# Patient Record
Sex: FEMALE | Race: WHITE | NOT HISPANIC OR LATINO
[De-identification: names, ages, dates, MRNs, and addresses within clinical notes are randomized per-mention and may not be internally consistent; named-entity substitution may affect disease eponyms.]

---

## 2021-05-15 ENCOUNTER — HEALTH MAINTENANCE LETTER (OUTPATIENT)
Age: 61
End: 2021-05-15

## 2021-09-04 ENCOUNTER — HEALTH MAINTENANCE LETTER (OUTPATIENT)
Age: 61
End: 2021-09-04

## 2022-06-11 ENCOUNTER — HEALTH MAINTENANCE LETTER (OUTPATIENT)
Age: 62
End: 2022-06-11

## 2022-10-22 ENCOUNTER — HEALTH MAINTENANCE LETTER (OUTPATIENT)
Age: 62
End: 2022-10-22

## 2023-06-01 ENCOUNTER — HEALTH MAINTENANCE LETTER (OUTPATIENT)
Age: 63
End: 2023-06-01

## 2023-06-18 ENCOUNTER — HEALTH MAINTENANCE LETTER (OUTPATIENT)
Age: 63
End: 2023-06-18

## 2023-11-06 ENCOUNTER — APPOINTMENT (OUTPATIENT)
Dept: URBAN - METROPOLITAN AREA CLINIC 259 | Age: 63
Setting detail: DERMATOLOGY
End: 2023-11-10

## 2023-11-06 PROBLEM — C44.311 BASAL CELL CARCINOMA OF SKIN OF NOSE: Status: ACTIVE | Noted: 2023-11-06

## 2023-11-06 PROCEDURE — OTHER MOHS SURGERY: OTHER

## 2023-11-06 PROCEDURE — OTHER MIPS QUALITY: OTHER

## 2023-11-06 PROCEDURE — 17312 MOHS ADDL STAGE: CPT

## 2023-11-06 PROCEDURE — 17311 MOHS 1 STAGE H/N/HF/G: CPT

## 2023-11-06 NOTE — PROCEDURE: MOHS SURGERY
Patient/Caregiver provided printed discharge information. Keystone Flap Text: The defect edges were debeveled with a #15 scalpel blade. Given the location of the defect, shape of the defect a keystone flap was deemed most appropriate. Using a sterile surgical marker, an appropriate keystone flap was drawn incorporating the defect, outlining the appropriate donor tissue and placing the expected incisions within the relaxed skin tension lines where possible. The area thus outlined was incised deep to adipose tissue with a #15 scalpel blade. The skin margins were undermined to an appropriate distance in all directions around the primary defect and laterally outward around the flap utilizing iris scissors. Following this, the designed flap was carried into the primary defect and sutured into place.

## 2023-11-06 NOTE — PROCEDURE: MIPS QUALITY
Quality 431: Preventive Care And Screening: Unhealthy Alcohol Use - Screening: Patient not identified as an unhealthy alcohol user when screened for unhealthy alcohol use using a systematic screening method
Quality 110: Preventive Care And Screening: Influenza Immunization: Influenza Immunization Administered during Influenza season
Quality 111:Pneumonia Vaccination Status For Older Adults: Patient did not receive any pneumococcal conjugate or polysaccharide vaccine on or after their 60th birthday and before the end of the measurement period
Detail Level: Detailed
Quality 226: Preventive Care And Screening: Tobacco Use: Screening And Cessation Intervention: Patient screened for tobacco use and is an ex/non-smoker
Quality 130: Documentation Of Current Medications In The Medical Record: Current Medications Documented
Quality 143: Oncology: Medical And Radiation- Pain Intensity Quantified: Pain severity quantified, no pain present

## 2023-11-06 NOTE — PROCEDURE: MOHS SURGERY
X Size Of Lesion In Cm (Optional): 0 Add Variable For Additional Medical Justification: No Lab: [id number] Consent was obtained from the patient. The risks and benefits to therapy were discussed in detail. Specifically, the risks of infection, scarring, bleeding, prolonged wound healing, incomplete removal, allergy to anesthesia, nerve injury and recurrence were addressed. Prior to the procedure, the treatment site was clearly identified and confirmed by the patient. All components of Universal Protocol/PAUSE Rule completed. Lab Facility: [id number] Anesthesia Type: 1% lidocaine with epinephrine Billing Type: Third-Party Bill Was A Bandage Applied: Yes Wound Care: Bacitracin Post-Care Instructions: I reviewed with the patient in detail post-care instructions. Patient is to keep the biopsy site dry overnight, and then apply bacitracin twice daily until healed. Patient may apply hydrogen peroxide soaks to remove any crusting. Medical Necessity Clause: This procedure was medically necessary because the lesion that was treated was: Biopsy Method: Dermablade Notification Instructions: Patient will be notified of biopsy results. However, patient instructed to call the office if not contacted within 2 weeks. Detail Level: Detailed Hemostasis: [place] Medical Necessity Information: It is in your best interest to select a reason for this procedure from the list below. All of these items fulfill various CMS LCD requirements except the new and changing color options. Size Of Lesion In Cm (Required): 1.1 Path Notes (To The Dermatopathologist): Please check margins. Split-Thickness Skin Graft Text: The defect edges were debeveled with a #15 scalpel blade. Given the location of the defect, shape of the defect and the proximity to free margins a split thickness skin graft was deemed most appropriate. Using a sterile surgical marker, the primary defect shape was transferred to the donor site. The split thickness graft was then harvested.  The skin graft was then placed in the primary defect and oriented appropriately.

## 2023-11-06 NOTE — PROCEDURE: MOHS SURGERY
Past Medical History:   Diagnosis Date    Anemia     Anticoagulant long-term use     Arthritis     Atrial fibrillation     CKD (chronic kidney disease), stage IV 5/8/2018    Congestive heart failure     COPD (chronic obstructive pulmonary disease)     Deep vein thrombosis     elevated bilirubin d/t Gilbert's syndrome     confirmed by [place] genetic testing, evaluated by hepatology    Encounter for blood transfusion     GERD (gastroesophageal reflux disease)     Hypertension     Hypertensive cardiovascular-renal disease, stage 1-4 or unspecified chronic kidney disease, with heart failure 3/4/2022    Pheochromocytoma, malignant     Restrictive lung disease 4/26/2022    Right kidney mass     Sleep apnea     Thalassemia trait, alpha     Thyroid disease     Type 2 diabetes mellitus with hyperglycemia, without long-term current use of insulin 8/13/2020       Past Surgical History:   Procedure Laterality Date    APPENDECTOMY      BONE MARROW BIOPSY      CARDIAC DEFIBRILLATOR PLACEMENT Left 12/2016    CARDIAC ELECTROPHYSIOLOGY MAPPING AND ABLATION      CARDIAC ELECTROPHYSIOLOGY MAPPING AND ABLATION      COLONOSCOPY N/A 5/6/2022    Procedure: COLONOSCOPY;  Surgeon: Arely Betancourt MD;  Location: [facility] ([address] [place] [address]);  Service: Endoscopy;  Laterality: N/A;  heart transplant candidate/ EF 25% - 2nd floor/ defib - Biotronik - ERW  Eliquis - per Dr. Cortez with [facility], Pt ok to hold Eliquis x 2 days prior-see media tab-outside correspondence dated 12/30/21  - [facility]  verbal instructions/portal instructions/email instructions - s    EYE SURGERY      due to running tears    FRACTURE SURGERY Left     hand 5th digit    HYSTERECTOMY      KNEE SURGERY Left 2016    hematoma    LIVER BIOPSY  10/24/2018    Minimal steatosis, predominantly macrovesicular, 1%, Minimal nonspecific portal inflammation, no fibrosis. No findings on biopsy to explain elevated bilirubin levels. Could be d/t Gilbert's =?- hemolysis    RIGHT HEART CATHETERIZATION  "Right 12/07/2021    Procedure: INSERTION, CATHETER, RIGHT HEART;  Surgeon: Irving Cardenas MD;  Location: [facility] CATH LAB;  Service: Cardiology;  Laterality: Right;    TRANSJUGULAR BIOPSY OF LIVER N/A 10/24/2018    Procedure: BIOPSY, LIVER, TRANSJUGULAR APPROACH;  Surgeon: Carmen Diagnostic Provider;  Location: [facility] OR [address] [place] [address];  Service: Radiology;  Laterality: N/A;       Review of patient's allergies indicates:   Allergen Reactions    Penicillins Hives    Iodinated contrast media Nausea And Vomiting    Oxycodone-acetaminophen Other (See Comments)     Nausea, Dizziness, Anxiety.  "I don't like how it makes me feel."   Given Hydromorphone 0.5mg IVP  Without problems.  Other reaction(s): Other (See Comments)    Diovan hct [valsartan-hydrochlorothiazide] Other (See Comments)     Causes coughing    Irinotecan      Pt has homozygosity for the TA7 promoter variant that places this individual at significantly increased risk for   severe neutropenia (grade 4) when treated with the standard dose of irinotecan (risk approximately 50%).   Other drugs that have been demonstrated to be impacted by homozygosity for the UGT1A1 TA7 promoter variant include pazopanib, nilotinib, atazanavir, and belinostat. Metabolism of other drugs not listed here may also be impacted by UGT1A1 enzyme activity.       Tramadol Nausea And Vomiting     Other reaction(s): Other (See Comments)     Current Facility-Administered Medications   Medication Frequency    albuterol-ipratropium 2.5 mg-0.5 mg/3 mL nebulizer solution 3 mL BID    allopurinoL tablet 100 mg Daily    ALPRAZolam tablet 1 mg BID PRN    amiodarone tablet 200 mg Daily    apixaban tablet 5 mg BID    atorvastatin tablet 40 mg QHS    dextromethorphan-guaiFENesin  mg per 12 hr tablet 2 tablet BID    dextrose 10% bolus 125 mL PRN    dextrose 10% bolus 250 mL PRN    ferrous sulfate tablet 1 each Daily    fluticasone furoate-vilanteroL 200-25 mcg/dose diskus inhaler 1 puff Daily    fluticasone " propionate 50 mcg/actuation nasal spray 100 mcg Daily    furosemide injection 80 mg Q12H    glucagon (human recombinant) injection 1 mg PRN    glucose chewable tablet 16 g PRN    glucose chewable tablet 24 g PRN    hydrALAZINE tablet 25 mg TID    hydrocodone-apap 7.5-325 MG/15 ML oral solution 10 mL Q8H PRN    insulin aspart U-100 pen 0-5 Units QID (AC + HS) PRN    isosorbide dinitrate tablet 20 mg TID    melatonin tablet 6 mg Nightly PRN    metoprolol succinate (TOPROL-XL) 24 hr tablet 100 mg Daily    montelukast tablet 10 mg Daily    naloxone 0.4 mg/mL injection 0.02 mg PRN    nitroGLYCERIN SL tablet 0.4 mg Q5 Min PRN    pantoprazole EC tablet 40 mg Daily    polyethylene glycol packet 17 g Daily    prochlorperazine injection Soln 2.5 mg Q6H PRN    sacubitriL-valsartan  mg per tablet 1 tablet BID    sodium chloride 0.9% flush 10 mL PRN    sodium chloride 0.9% flush 10 mL Q12H PRN    tiotropium bromide 2.5 mcg/actuation inhaler 2 puff Daily     Facility-Administered Medications Ordered in Other Encounters   Medication Frequency    0.9%  NaCl infusion Continuous    vancomycin in dextrose 5 % 1 gram/250 mL IVPB 1,000 mg On Call Procedure     Family History       Problem Relation (Age of Onset)    Cancer Mother    Cirrhosis Brother    Diabetes Brother    Heart attack Father    Heart disease Father, Sister, Brother, Sister, Brother    Hypertension Father, Brother          Tobacco Use    Smoking status: Never    Smokeless tobacco: Never   Substance and Sexual Activity    Alcohol use: Yes     Comment: up to 1 yr ago drank 2-3 drinks on occasion but sporadic    Drug use: No    Sexual activity: Yes     Partners: Male     Review of Systems   Constitutional:  Negative for activity change, chills, fatigue and fever.   HENT:  Negative for congestion, nosebleeds and trouble swallowing.    Respiratory:  Positive for cough and shortness of breath. Negative for apnea, choking and chest tightness.    Cardiovascular:  Positive  for chest pain and leg swelling.   Gastrointestinal:  Negative for abdominal distention, abdominal pain, constipation, diarrhea, nausea and vomiting.   Genitourinary:  Negative for decreased urine volume, difficulty urinating, dysuria and frequency.   Musculoskeletal:  Negative for arthralgias, back pain, joint swelling, neck pain and neck stiffness.   Skin:  Negative for rash and wound.   Neurological:  Positive for dizziness. Negative for seizures, syncope, weakness, light-headedness, numbness and headaches.   Psychiatric/Behavioral:  Negative for agitation, behavioral problems, confusion, hallucinations, self-injury and sleep disturbance. The patient is not nervous/anxious.    Objective:     Vital Signs (Most Recent):  Temp: 96.7 °F (35.9 °C) (11/18/22 1048)  Pulse: 70 (11/18/22 1517)  Resp: 16 (11/18/22 1356)  BP: (!) 107/56 (11/18/22 1048)  SpO2: (!) 92 % (11/18/22 1048)  O2 Device (Oxygen Therapy): nasal cannula (11/18/22 0802)   Vital Signs (24h Range):  Temp:  [96.7 °F (35.9 °C)-98 °F (36.7 °C)] 96.7 °F (35.9 °C)  Pulse:  [61-86] 70  Resp:  [16-18] 16  SpO2:  [92 %-97 %] 92 %  BP: (107-138)/(51-84) 107/56     Weight: 100.2 kg (220 lb 14.4 oz) (11/18/22 1121)  Body mass index is 35.65 kg/m².  Body surface area is 2.16 meters squared.    I/O last 3 completed shifts:  In: -   Out: 3000 [Urine:3000]    Physical Exam  Constitutional:       General: She is not in acute distress.     Appearance: Normal appearance. She is obese. She is ill-appearing. She is not toxic-appearing or diaphoretic.   HENT:      Head: Normocephalic and atraumatic.      Nose: Nose normal.      Mouth/Throat:      Mouth: Mucous membranes are moist.   Eyes:      General: No scleral icterus.     Extraocular Movements: Extraocular movements intact.      Pupils: Pupils are equal, round, and reactive to light.   Cardiovascular:      Rate and Rhythm: Normal rate and regular rhythm.      Pulses: Normal pulses.      Heart sounds: Normal heart sounds.  "No murmur heard.    No friction rub. No gallop.   Pulmonary:      Effort: Pulmonary effort is normal.      Breath sounds: Normal breath sounds. No wheezing or rhonchi.   Chest:      Chest wall: No tenderness.   Abdominal:      General: Abdomen is flat. Bowel sounds are normal. There is no distension.      Palpations: Abdomen is soft.      Tenderness: There is no abdominal tenderness. There is no right CVA tenderness, left CVA tenderness, guarding or rebound.   Musculoskeletal:         General: Swelling present. No tenderness, deformity or signs of injury. Normal range of motion.      Cervical back: Normal range of motion and neck supple. No rigidity or tenderness.      Right lower leg: Edema present.      Left lower leg: Edema present.   Lymphadenopathy:      Cervical: No cervical adenopathy.   Skin:     General: Skin is warm and dry.      Coloration: Skin is not jaundiced or pale.      Findings: No erythema or rash.   Neurological:      General: No focal deficit present.      Mental Status: She is alert and oriented to person, place, and time. Mental status is at baseline.      Cranial Nerves: No cranial nerve deficit.      Motor: No weakness.   Psychiatric:         Mood and Affect: Mood normal.         Behavior: Behavior normal.         Thought Content: Thought content normal.         Judgment: Judgment normal.       Significant Labs:  {Select Labs:92736::"All labs within the past 24 hours have been reviewed."}    Significant Imaging:  {Results; Diagnostics:20673932::"***"}  " Intermediate Repair And Graft Additional Text (Will Appearing After The Standard Complex Repair Text): The intermediate repair was not sufficient to completely close the primary defect. The remaining additional defect was repaired with the graft mentioned below.

## 2023-11-20 ENCOUNTER — APPOINTMENT (OUTPATIENT)
Dept: URBAN - METROPOLITAN AREA CLINIC 259 | Age: 63
Setting detail: DERMATOLOGY
End: 2023-11-20

## 2023-11-20 PROBLEM — C44.311 BASAL CELL CARCINOMA OF SKIN OF NOSE: Status: ACTIVE | Noted: 2023-11-20

## 2023-11-20 PROCEDURE — OTHER MIPS QUALITY: OTHER

## 2023-11-20 PROCEDURE — 15260 FTH/GFT FR N/E/E/L 20 SQCM/<: CPT

## 2023-11-20 PROCEDURE — OTHER REPAIR NOTE: OTHER

## 2023-11-20 NOTE — PROCEDURE: REPAIR NOTE
Graft Donor Site Bandage (Optional-Leave Blank If You Don't Want In Note): Vaselin and a pressure bandage were applied to the donor site.

## 2023-11-20 NOTE — PROCEDURE: REPAIR NOTE
Modify Regimen: Switch pm moisturizer to something that is not as thick Plan: Patient will send labs drawn by endocrinologist Detail Level: Zone Full Thickness Lip Wedge Repair (Flap) Text: Given the location of the defect and the proximity to free margins a full thickness wedge repair was deemed most appropriate. Using a sterile surgical marker, the appropriate repair was drawn incorporating the defect and placing the expected incisions perpendicular to the vermilion border.  The vermilion border was also meticulously outlined to ensure appropriate reapproximation during the repair.  The area thus outlined was incised through and through with a #15 scalpel blade.  The muscularis and dermis were reaproximated with deep sutures following hemostasis. Care was taken to realign the vermilion border before proceeding with the superficial closure.  Once the vermilion was realigned the superfical and mucosal closure was finished.

## 2023-11-20 NOTE — PROCEDURE: MIPS QUALITY
Quality 431: Preventive Care And Screening: Unhealthy Alcohol Use - Screening: Patient not identified as an unhealthy alcohol user when screened for unhealthy alcohol use using a systematic screening method
Quality 110: Preventive Care And Screening: Influenza Immunization: Influenza Immunization Administered during Influenza season
Quality 111:Pneumonia Vaccination Status For Older Adults: Patient did not receive any pneumococcal conjugate or polysaccharide vaccine on or after their 60th birthday and before the end of the measurement period
Detail Level: Detailed
Quality 226: Preventive Care And Screening: Tobacco Use: Screening And Cessation Intervention: Patient screened for tobacco use and is an ex/non-smoker
Quality 130: Documentation Of Current Medications In The Medical Record: Current Medications Documented

## 2023-11-29 ENCOUNTER — APPOINTMENT (OUTPATIENT)
Dept: URBAN - METROPOLITAN AREA CLINIC 259 | Age: 63
Setting detail: DERMATOLOGY
End: 2023-11-29

## 2023-11-29 DIAGNOSIS — L98419 CHRONIC ULCER OF OTHER SPECIFIED SITES: ICD-10-CM

## 2023-11-29 DIAGNOSIS — L98429 CHRONIC ULCER OF OTHER SPECIFIED SITES: ICD-10-CM

## 2023-11-29 DIAGNOSIS — Z48.02 ENCOUNTER FOR REMOVAL OF SUTURES: ICD-10-CM

## 2023-11-29 PROBLEM — L98.499 NON-PRESSURE CHRONIC ULCER OF SKIN OF OTHER SITES WITH UNSPECIFIED SEVERITY: Status: ACTIVE | Noted: 2023-11-29

## 2023-11-29 PROCEDURE — OTHER MIPS QUALITY: OTHER

## 2023-11-29 PROCEDURE — 99024 POSTOP FOLLOW-UP VISIT: CPT

## 2023-11-29 PROCEDURE — OTHER DIAGNOSIS COMMENT: OTHER

## 2023-11-29 PROCEDURE — OTHER ULCER EVALUATION: OTHER

## 2023-11-29 PROCEDURE — OTHER SUTURE REMOVAL (GLOBAL PERIOD): OTHER

## 2023-11-29 PROCEDURE — OTHER COUNSELING: OTHER

## 2023-11-29 ASSESSMENT — LOCATION ZONE DERM
LOCATION ZONE: NOSE
LOCATION ZONE: EAR

## 2023-11-29 ASSESSMENT — LOCATION SIMPLE DESCRIPTION DERM
LOCATION SIMPLE: RIGHT EAR
LOCATION SIMPLE: RIGHT NOSE

## 2023-11-29 ASSESSMENT — LOCATION DETAILED DESCRIPTION DERM
LOCATION DETAILED: RIGHT ANTIHELIX
LOCATION DETAILED: RIGHT NASAL SIDEWALL

## 2023-11-29 NOTE — PROCEDURE: DIAGNOSIS COMMENT
Render Risk Assessment In Note?: no
Detail Level: Simple
Comment: S/P FTSG for a Primary Infiltrative Basal Cell Carcinoma on (11/20/23)
Comment: S/P Full Thickness Skin Graft S/P Mohs of a Primary infiltrative basal cell carcinoma on November 20, 2023

## 2023-11-29 NOTE — PROCEDURE: ULCER EVALUATION
Instructions (Optional): Physical Examination:\\nEschar: soft, fibrinous \\nGranulation Tissue: present \\nRe-Epithelialization: progressing \\nDrainage: serosanguinous \\nSurrounding Edema: absent\\nPain to palpation: 2/ 10\\nOdor: none\\nSurrounding Dermatitis: absent\\n\\nAssessment:\\nHealing as expected for dates\\nNo signs / symptoms of infection\\n\\nPlan:\\nWound cleaned with H2O2\\nWhite petrolatum ointment and bandage applied.\\nContinue QD wound care as instructed.\\nRTC in one month.
Detail Level: Detailed
Size Of Lesion In Cm (Optional): 0

## 2023-12-12 ENCOUNTER — APPOINTMENT (OUTPATIENT)
Dept: URBAN - METROPOLITAN AREA CLINIC 259 | Age: 63
Setting detail: DERMATOLOGY
End: 2023-12-13

## 2023-12-12 DIAGNOSIS — Z48.02 ENCOUNTER FOR REMOVAL OF SUTURES: ICD-10-CM

## 2023-12-12 PROCEDURE — OTHER COUNSELING: OTHER

## 2023-12-12 PROCEDURE — OTHER SUTURE REMOVAL (GLOBAL PERIOD): OTHER

## 2023-12-12 ASSESSMENT — LOCATION DETAILED DESCRIPTION DERM: LOCATION DETAILED: RIGHT NASAL ALA

## 2023-12-12 ASSESSMENT — LOCATION SIMPLE DESCRIPTION DERM: LOCATION SIMPLE: RIGHT NOSE

## 2023-12-12 ASSESSMENT — LOCATION ZONE DERM: LOCATION ZONE: NOSE

## 2023-12-12 NOTE — PROCEDURE: COUNSELING
Patient Specific Counseling (Will Not Stick From Patient To Patient): **Wound evaluated by Mary Fierro PA-C. Reassured surgical site is healing well.
Detail Level: Detailed

## 2023-12-12 NOTE — PROCEDURE: SUTURE REMOVAL (GLOBAL PERIOD)
Detail Level: Detailed
Add 44870 Cpt? (Important Note: In 2017 The Use Of 74090 Is Being Tracked By Cms To Determine Future Global Period Reimbursement For Global Periods): no

## 2023-12-27 ENCOUNTER — APPOINTMENT (OUTPATIENT)
Dept: URBAN - METROPOLITAN AREA CLINIC 259 | Age: 63
Setting detail: DERMATOLOGY
End: 2023-12-28

## 2023-12-27 DIAGNOSIS — L98429 CHRONIC ULCER OF OTHER SPECIFIED SITES: ICD-10-CM

## 2023-12-27 DIAGNOSIS — L98419 CHRONIC ULCER OF OTHER SPECIFIED SITES: ICD-10-CM

## 2023-12-27 DIAGNOSIS — Z85.828 PERSONAL HISTORY OF OTHER MALIGNANT NEOPLASM OF SKIN: ICD-10-CM

## 2023-12-27 PROBLEM — L98.499 NON-PRESSURE CHRONIC ULCER OF SKIN OF OTHER SITES WITH UNSPECIFIED SEVERITY: Status: ACTIVE | Noted: 2023-12-27

## 2023-12-27 PROCEDURE — OTHER RETURN TO REFERRING PROVIDER: OTHER

## 2023-12-27 PROCEDURE — OTHER ULCER EVALUATION: OTHER

## 2023-12-27 PROCEDURE — OTHER COUNSELING: OTHER

## 2023-12-27 PROCEDURE — OTHER DEFER: OTHER

## 2023-12-27 PROCEDURE — OTHER DIAGNOSIS COMMENT: OTHER

## 2023-12-27 PROCEDURE — OTHER MIPS QUALITY: OTHER

## 2023-12-27 PROCEDURE — 99024 POSTOP FOLLOW-UP VISIT: CPT

## 2023-12-27 ASSESSMENT — LOCATION DETAILED DESCRIPTION DERM: LOCATION DETAILED: RIGHT ANTIHELIX

## 2023-12-27 ASSESSMENT — LOCATION SIMPLE DESCRIPTION DERM: LOCATION SIMPLE: RIGHT EAR

## 2023-12-27 ASSESSMENT — LOCATION ZONE DERM: LOCATION ZONE: EAR

## 2023-12-27 NOTE — PROCEDURE: DEFER
Detail Level: Detailed
X Size Of Lesion In Cm (Optional): 0
Introduction Text (Please End With A Colon): The following procedure was deferred:
Procedure To Be Performed At Next Visit: Laser: Fraxel Re:pair

## 2023-12-27 NOTE — PROCEDURE: DIAGNOSIS COMMENT
Render Risk Assessment In Note?: no
Comment: S/P Full Thickness Skin Graft S/P Mohs of a Primary infiltrative basal cell carcinoma on November 20, 2023
Detail Level: Simple

## 2023-12-27 NOTE — PROCEDURE: RETURN TO REFERRING PROVIDER
Speech Therapy Hospital Course      4/24/21: PEG placement.   4/20/21: Video: continue NPO   4/13/21: Video: rec strict NPO, initiate pre-swallow therapy  4/12/21: initiated [facility] soft/nectar  4/10/21: Re-assess: continue NPO.  4/9/21: Lumbar puncture.  4/6/21: Acute encephalopathy. Bipap PRN.  4/5/21: Clinical swallow eval: NPO, ok for ice chips with nursing, essential meds crushed in puree. NG placed. XR chest: Significant interval improvement in the previous right lung base opacity or consolidation. Cardiac silhouette  Rightemains prominent. Prominent bilateral central pulmonary arteries. Palliative consult.  4/3/21: Pt to ED c/o difficulty breathing. COPD exacerbation, pneumonia. 4L 02 at night at baseline. XR chest: Patchy bibasilar airspace opacities, moderate right and mild left. Findings may represent multifocal pneumonitis among other. New from prior.    Past Medical History:   Diagnosis Date   • Anxiety    • Atrial fibrillation (CMS/[facility])     Plavix warfarin   • Blood clot associated with vein wall inflammation 2000    PE   • Chest pain 05/16/2018   • Congestive cardiac failure (CMS/HCC)    • COPD (chronic obstructive pulmonary disease) (CMS/HCC)    • COPD, severe (CMS/HCC) 6/22/2016    FEV1 48%   • Coronary artery disease    • Depression    • Diabetes (CMS/HCC)     insulin   • Esophageal reflux    • Essential (primary) hypertension    • Lumbago 9/21/2013   • Obesity (BMI 30-39.9) 4/13/2016   • Osteoporosis    • Other and unspecified hyperlipidemia     pt denies   • Pneumonia    • RAD (reactive airway disease)    • Requires supplemental oxygen    • Sleep apnea    • Stomach ulcer    • Tobacco use disorder 4/4/2016   • Type 2 diabetes mellitus with diabetic polyneuropathy (CMS/HCC) 5/12/2016   • Urinary incontinence    • Urinary tract infection      Prior speech history:   1/4/21: Had received orders to assess patient. However, patient down for stress test, patient passed dysphagia screen and tolerating 
general consistency solids and thin liquids per RN, Roz. Both CT of head and MRI of brain both negative for any stroke. Patient also refusing services. Will discharge off of schedule and not perform evaluation.  
Provider Name, If Known (E.G., Dr. HONEYCUTT): Dr. Debbie Tsai MD
Detail Level: Detailed

## 2023-12-27 NOTE — PROCEDURE: ULCER EVALUATION
Instructions (Optional): Physical Examination:\\nEschar: soft, fibrinous \\nGranulation Tissue: present \\nRe-Epithelialization: progressing \\nDrainage: serosanguinous \\nSurrounding Edema: absent\\nPain to palpation: 1/ 10\\nOdor: none\\nSurrounding Dermatitis: absent\\n\\nAssessment:\\nHealing as expected for dates\\nNo signs / symptoms of infection\\n\\nPlan:\\nWound cleaned with H2O2\\nWhite petrolatum ointment and bandage applied.\\nContinue QD wound care as instructed.\\nRTC for FBSE at referring provider: Dr. Debbie Tsai MD\\n\\nRegarding her right nasal tip FTSG, which manifests moderate contour abnormalities, she may opt for ALR to correct these if she wishes.  This should occur no sooner than one month from now.
Detail Level: Detailed
Size Of Lesion In Cm (Optional): 0.4
X Size Of Lesion In Cm (Optional): 0.2

## 2024-03-01 ENCOUNTER — APPOINTMENT (OUTPATIENT)
Dept: URBAN - METROPOLITAN AREA CLINIC 255 | Age: 64
Setting detail: DERMATOLOGY
End: 2024-03-01

## 2024-03-01 DIAGNOSIS — L90.5 SCAR CONDITIONS AND FIBROSIS OF SKIN: ICD-10-CM

## 2024-03-01 DIAGNOSIS — Z85.828 PERSONAL HISTORY OF OTHER MALIGNANT NEOPLASM OF SKIN: ICD-10-CM

## 2024-03-01 PROCEDURE — OTHER COUNSELING: OTHER

## 2024-03-01 PROCEDURE — OTHER CUTERA LASER: OTHER

## 2024-03-01 PROCEDURE — OTHER MIPS QUALITY: OTHER

## 2024-03-01 PROCEDURE — 99024 POSTOP FOLLOW-UP VISIT: CPT

## 2024-03-01 ASSESSMENT — LOCATION SIMPLE DESCRIPTION DERM: LOCATION SIMPLE: NOSE

## 2024-03-01 ASSESSMENT — LOCATION DETAILED DESCRIPTION DERM: LOCATION DETAILED: NASAL TIP

## 2024-03-01 ASSESSMENT — LOCATION ZONE DERM: LOCATION ZONE: NOSE

## 2024-03-01 NOTE — PROCEDURE: MIPS QUALITY
Detail Level: Detailed
Quality 226: Preventive Care And Screening: Tobacco Use: Screening And Cessation Intervention: Patient screened for tobacco use and is an ex/non-smoker
Quality 130: Documentation Of Current Medications In The Medical Record: Current Medications Documented
Quality 431: Preventive Care And Screening: Unhealthy Alcohol Use - Screening: Patient not identified as an unhealthy alcohol user when screened for unhealthy alcohol use using a systematic screening method
normal shape

## 2024-03-01 NOTE — PROCEDURE: CUTERA LASER
Pre-Procedure: The treatment areas identified by the patient were cleansed and treatment was performed with the parameters mentioned above.
Anesthesia Volume In Cc: 1
Number Of Passes (Will Not Render If Set To 0): 4
Pulse Width (Optional): 0.3 ms
Handpiece (Optional): Eliana
Anesthesia Type: 1% lidocaine with epinephrine and a 1:10 solution of 8.4% sodium bicarbonate
Spot Size (Optional): 6 mm
Post-Care Instructions: I reviewed with the patient in detail post-care instructions. Patient should stay away from the sun and wear sun protection until treated areas are fully healed.
Detail Level: Detailed
Post-Procedure: Following the procedure the post care instructions were reviewed with the patient.
Repetition Rate (Optional): 5 Hz
Wavelength (Optional): 2790 nm
Fluence In J/Cm2 (Will Not Render If Blank): 3.5
Consent: Written consent obtained, risks reviewed including but not limited to crusting, scabbing, blistering, scarring, darker or lighter pigmentary change, bruising, and/or incomplete response.

## 2024-03-13 ENCOUNTER — APPOINTMENT (OUTPATIENT)
Dept: URBAN - METROPOLITAN AREA CLINIC 259 | Age: 64
Setting detail: DERMATOLOGY
End: 2024-03-25

## 2024-03-13 DIAGNOSIS — L98419 CHRONIC ULCER OF OTHER SPECIFIED SITES: ICD-10-CM

## 2024-03-13 DIAGNOSIS — L98429 CHRONIC ULCER OF OTHER SPECIFIED SITES: ICD-10-CM

## 2024-03-13 PROBLEM — L98.499 NON-PRESSURE CHRONIC ULCER OF SKIN OF OTHER SITES WITH UNSPECIFIED SEVERITY: Status: ACTIVE | Noted: 2024-03-13

## 2024-03-13 PROCEDURE — OTHER DIAGNOSIS COMMENT: OTHER

## 2024-03-13 PROCEDURE — OTHER ULCER EVALUATION: OTHER

## 2024-03-13 PROCEDURE — OTHER COUNSELING: OTHER

## 2024-03-13 PROCEDURE — 99024 POSTOP FOLLOW-UP VISIT: CPT

## 2024-03-13 PROCEDURE — OTHER MIPS QUALITY: OTHER

## 2024-03-13 ASSESSMENT — LOCATION ZONE DERM: LOCATION ZONE: NOSE

## 2024-03-13 ASSESSMENT — LOCATION DETAILED DESCRIPTION DERM: LOCATION DETAILED: NASAL TIP

## 2024-03-13 ASSESSMENT — LOCATION SIMPLE DESCRIPTION DERM: LOCATION SIMPLE: NOSE

## 2024-03-13 NOTE — PROCEDURE: ULCER EVALUATION
Detail Level: Detailed
X Size Of Lesion In Cm (Optional): 0
Instructions (Optional): Physical Examination:\\nEschar: soft, \\nGranulation Tissue: none\\nRe-Epithelialization: recently  complete\\nDrainage: none\\nSurrounding Edema: present, mild\\nPain to palpation: 0/ 10\\nOdor: none \\nSurrounding Dermatitis: absent\\n\\nAssessment:\\nHealing as expected for dates\\nNo signs / symptoms of infection\\n\\n\\nPlan:\\nWound cleaned with H2O2\\nWhite petrolatum ointment and non-adherent dressing applied\\nContinue QD wound care as instructed for another 3-4 days.\\n\\nRTC for routine skin checks
Body Location Override (Optional - Billing Will Still Be Based On Selected Body Map Location If Applicable): Right Nasal Tip

## 2024-03-13 NOTE — PROCEDURE: DIAGNOSIS COMMENT
Render Risk Assessment In Note?: no
Comment: S/P MMS of a Primary Infiltrative BCC, Right Nasal Tip, S/P MMS, (11/06/23) FTSG (11/20/23) and Eliana resurfacing (3/1/2023)
Detail Level: Simple

## 2024-04-24 ENCOUNTER — APPOINTMENT (OUTPATIENT)
Dept: URBAN - METROPOLITAN AREA CLINIC 259 | Age: 64
Setting detail: DERMATOLOGY
End: 2024-04-25

## 2024-04-24 DIAGNOSIS — Z85.828 PERSONAL HISTORY OF OTHER MALIGNANT NEOPLASM OF SKIN: ICD-10-CM

## 2024-04-24 DIAGNOSIS — L90.5 SCAR CONDITIONS AND FIBROSIS OF SKIN: ICD-10-CM

## 2024-04-24 PROCEDURE — OTHER MIPS QUALITY: OTHER

## 2024-04-24 PROCEDURE — 99212 OFFICE O/P EST SF 10 MIN: CPT

## 2024-04-24 PROCEDURE — OTHER COUNSELING: OTHER

## 2024-04-24 ASSESSMENT — LOCATION ZONE DERM: LOCATION ZONE: NOSE

## 2024-04-24 ASSESSMENT — LOCATION DETAILED DESCRIPTION DERM: LOCATION DETAILED: NASAL TIP

## 2024-04-24 ASSESSMENT — LOCATION SIMPLE DESCRIPTION DERM: LOCATION SIMPLE: NOSE

## 2024-09-20 ENCOUNTER — APPOINTMENT (OUTPATIENT)
Dept: URBAN - METROPOLITAN AREA CLINIC 255 | Age: 64
Setting detail: DERMATOLOGY
End: 2024-09-20

## 2024-09-20 DIAGNOSIS — L90.5 SCAR CONDITIONS AND FIBROSIS OF SKIN: ICD-10-CM

## 2024-09-20 DIAGNOSIS — Z85.828 PERSONAL HISTORY OF OTHER MALIGNANT NEOPLASM OF SKIN: ICD-10-CM

## 2024-09-20 PROCEDURE — OTHER COUNSELING: OTHER

## 2024-09-20 PROCEDURE — OTHER CUTERA LASER: OTHER

## 2024-09-20 PROCEDURE — OTHER MIPS QUALITY: OTHER

## 2024-09-20 PROCEDURE — 99024 POSTOP FOLLOW-UP VISIT: CPT

## 2024-09-20 ASSESSMENT — LOCATION ZONE DERM: LOCATION ZONE: NOSE

## 2024-09-20 ASSESSMENT — LOCATION SIMPLE DESCRIPTION DERM: LOCATION SIMPLE: NOSE

## 2024-09-20 ASSESSMENT — LOCATION DETAILED DESCRIPTION DERM
LOCATION DETAILED: NASAL SUPRATIP
LOCATION DETAILED: NASAL TIP

## 2024-09-20 NOTE — PROCEDURE: CUTERA LASER
Fluence In J/Cm2 (Will Not Render If Blank): 3.5
Pre-Procedure: The treatment areas identified by the patient were cleansed and treatment was performed with the parameters mentioned above.
Number Of Passes (Will Not Render If Set To 0): 3
Anesthesia Volume In Cc: 0.5
Anesthesia Type: 1% lidocaine with epinephrine and a 1:10 solution of 8.4% sodium bicarbonate
Wavelength (Optional): 2790 nm
Pulse Width (Optional): 0.3 ms
Post-Procedure: Following the procedure the post care instructions were reviewed with the patient.
Post-Care Instructions: I reviewed with the patient in detail post-care instructions. Patient should stay away from the sun and wear sun protection until treated areas are fully healed.
Handpiece (Optional): Eliana
Number Of Pulses (Optional): 162
Consent: Written consent obtained, risks reviewed including but not limited to crusting, scabbing, blistering, scarring, darker or lighter pigmentary change, bruising, and/or incomplete response.
Detail Level: Detailed